# Patient Record
Sex: MALE | Race: WHITE | NOT HISPANIC OR LATINO | Employment: FULL TIME | URBAN - METROPOLITAN AREA
[De-identification: names, ages, dates, MRNs, and addresses within clinical notes are randomized per-mention and may not be internally consistent; named-entity substitution may affect disease eponyms.]

---

## 2017-02-23 ENCOUNTER — OFFICE VISIT - HEALTHEAST (OUTPATIENT)
Dept: FAMILY MEDICINE | Facility: CLINIC | Age: 32
End: 2017-02-23

## 2017-02-23 DIAGNOSIS — F34.0 CYCLOTHYMIA: ICD-10-CM

## 2017-02-23 ASSESSMENT — MIFFLIN-ST. JEOR: SCORE: 1797.2

## 2017-03-22 ENCOUNTER — COMMUNICATION - HEALTHEAST (OUTPATIENT)
Dept: FAMILY MEDICINE | Facility: CLINIC | Age: 32
End: 2017-03-22

## 2020-08-20 ENCOUNTER — AMBULATORY - HEALTHEAST (OUTPATIENT)
Dept: FAMILY MEDICINE | Facility: CLINIC | Age: 35
End: 2020-08-20

## 2020-08-20 ENCOUNTER — VIRTUAL VISIT (OUTPATIENT)
Dept: FAMILY MEDICINE | Facility: OTHER | Age: 35
End: 2020-08-20
Payer: COMMERCIAL

## 2020-08-20 DIAGNOSIS — Z20.822 SUSPECTED COVID-19 VIRUS INFECTION: ICD-10-CM

## 2020-08-20 PROCEDURE — 99421 OL DIG E/M SVC 5-10 MIN: CPT | Performed by: PHYSICIAN ASSISTANT

## 2020-08-20 NOTE — PROGRESS NOTES
"Date: 2020 11:39:08  Clinician: Francisco Parr  Clinician NPI: [id number]  Patient: Jose Carlos Blandon  Patient : 1985  Patient Address: [address] [place] [address]  Patient Phone: [phone]  Visit Protocol: URI  Patient Summary:  Jose Carlos is a 34 year old ( : 1985 ) male who initiated a Visit for COVID-19 (Coronavirus) evaluation and screening. When asked the question \"Please sign me up to receive news, health information and promotions from [employer]..\", Jose Carlos responded \"No\".    Jose Carlos states his symptoms started 1-2 days ago.   His symptoms consist of enlarged lymph nodes, diarrhea, nasal congestion, rhinitis, myalgia, and facial pain or pressure.   Symptom details     Nasal secretions: The color of his mucus is white, yellow, and green.    Facial pain or pressure: The facial pain or pressure does not feel worse when bending or leaning forward.      Jose Carlos denies having ear pain, headache, chills, malaise, fever, wheezing, sore throat, teeth pain, ageusia, cough, vomiting, nausea, and anosmia. He also denies having recent facial or sinus surgery in the past 60 days and taking antibiotic medication in the past month. He is not experiencing dyspnea.   Precipitating events  He has not recently been exposed to someone with influenza. Jose Carlos has been in close contact with the following high risk individuals: immunocompromised people and children under the age of 5.   Pertinent COVID-19 (Coronavirus) information  In the past 14 days, Jose Carlos has not worked in a congregate living setting.   He does not work or volunteer as healthcare worker or a  and does not work or volunteer in a healthcare facility.   Jose Carlos also has not lived in a congregate living setting in the past 14 days. He lives with a healthcare worker.   Jose Carlos has not had a close contact with a laboratory-confirmed COVID-19 patient within 14 days of symptom onset.   Since 2019, Jose Carlos " and has not had upper respiratory infection or influenza-like illness. Has not been diagnosed with lab-confirmed COVID-19 test   Pertinent medical history  Jose Carlos had 1 sinus infection within the past year.   Jose Carlos needs a return to work/school note.   Weight: 200 lbs   Jose Carlos does not smoke or use smokeless tobacco.   Weight: 200 lbs    MEDICATIONS: Lamictal oral, ALLERGIES: NKDA  Clinician Response:  Dear Jose Carlos,   Your symptoms show that you may have coronavirus (COVID-19). This illness can cause fever, cough and trouble breathing. Many people get a mild case and get better on their own. Some people can get very sick.  What should I do?  We would like to test you for this virus.   1. Please call [phone] to schedule your visit. Explain that you were referred by [facility] to have a COVID-19 test. Be ready to share your [facility] visit ID number.  The following will serve as your written order for this COVID Test, ordered by me, for the indication of suspected COVID [Z20.828]: The test will be ordered in [employer], our electronic health record, after you are scheduled. It will show as ordered and authorized by Rigo Antony MD.  Order: COVID-19 (Coronavirus) PCR for SYMPTOMATIC testing from [facility].      2. When it's time for your COVID test:  Stay at least 6 feet away from others. (If someone will drive you to your test, stay in the backseat, as far away from the  as you can.)   Cover your mouth and nose with a mask, tissue or washcloth.  Go straight to the testing site. Don't make any stops on the way there or back.      3.Starting now: Stay home and away from others (self-isolate) until:   You've had no fever---and no medicine that reduces fever---for one full day (24 hours). And...   Your other symptoms have gotten better. For example, your cough or breathing has improved. And...   At least 10 days have passed since your symptoms started.       During this time, don't leave the house except for testing or  "medical care.   Stay in your own room, even for meals. Use your own bathroom if you can.   Stay away from others in your home. No hugging, kissing or shaking hands. No visitors.  Don't go to work, school or anywhere else.    Clean \"high touch\" surfaces often (doorknobs, counters, handles, etc.). Use a household cleaning spray or wipes. You'll find a full list of  on the EPA website: www.epa.gov/pesticide-registration/list-n-disinfectants-use-against-sars-cov-2.   Cover your mouth and nose with a mask, tissue or washcloth to avoid spreading germs.  Wash your hands and face often. Use soap and water.  Caregivers in these groups are at risk for severe illness due to COVID-19:  o People 65 years and older  o People who live in a nursing home or long-term care facility  o People with chronic disease (lung, heart, cancer, diabetes, kidney, liver, immunologic)  o People who have a weakened immune system, including those who:   Are in cancer treatment  Take medicine that weakens the immune system, such as corticosteroids  Had a bone marrow or organ transplant  Have an immune deficiency  Have poorly controlled HIV or AIDS  Are obese (body mass index of 40 or higher)  Smoke regularly   o Caregivers should wear gloves while washing dishes, handling laundry and cleaning bedrooms and bathrooms.  o Use caution when washing and drying laundry: Don't shake dirty laundry, and use the warmest water setting that you can.  o For more tips, go to www.cdc.gov/coronavirus/2019-ncov/downloads/10Things.pdf.    4.Sign up for [employer]. We know it's scary to hear that you might have COVID-19. We want to track your symptoms to make sure you're okay over the next 2 weeks. Please look for an email from [employer]---this is a free, online program that we'll use to keep in touch. To sign up, follow the link in the email. Learn more at [email or web address]  How can I take care of myself?   Get lots of rest. Drink extra " fluids (unless a doctor has told you not to).   Take Tylenol (acetaminophen) for fever or pain. If you have liver or kidney problems, ask your family doctor if it's okay to take Tylenol.   Adults can take either:    650 mg (two 325 mg pills) every 4 to 6 hours, or...   1,000 mg (two 500 mg pills) every 8 hours as needed.    Note: Don't take more than 3,000 mg in one day. Acetaminophen is found in many medicines (both prescribed and over-the-counter medicines). Read all labels to be sure you don't take too much.   For children, check the Tylenol bottle for the right dose. The dose is based on the child's age or weight.    If you have other health problems (like cancer, heart failure, an organ transplant or severe kidney disease): Call your specialty clinic if you don't feel better in the next 2 days.       Know when to call 911. Emergency warning signs include:    Trouble breathing or shortness of breath Pain or pressure in the chest that doesn't go away Feeling confused like you haven't felt before, or not being able to wake up Bluish-colored lips or face.  Where can I get more information?   [facility] -- About COVID-19: [email or web address]   CDC -- What to Do If You're Sick: www.cdc.gov/coronavirus/2019-ncov/about/steps-when-sick.html   CDC -- Ending Home Isolation: www.cdc.gov/coronavirus/2019-ncov/hcp/disposition-in-home-patients.html   CDC -- Caring for Someone: www.cdc.gov/coronavirus/2019-ncov/if-you-are-sick/care-for-someone.html   [facility] -- Interim Guidance for Hospital Discharge to Home: www.health.[facility].[place]/diseases/coronavirus/hcp/hospdischarge.pdf   [facility] clinical trials (COVID-19 research studies): [email or web address].[email or web address]/umn-clinical-trials    Below are the COVID-19 hotlines at the [place] Department of Health ([facility]). Interpreters are available.    For health questions: Call 201-215-8142 or 1-679.596.8673 (7 a.m. to 7 p.m.) For questions about schools and  childcare: Call 436-412-3827 or 1-298.667.6905 (7 a.m. to 7 p.m.)    Diagnosis: Nasal congestion  Diagnosis ICD: R09.81

## 2020-08-22 ENCOUNTER — COMMUNICATION - HEALTHEAST (OUTPATIENT)
Dept: SCHEDULING | Facility: CLINIC | Age: 35
End: 2020-08-22

## 2020-10-13 ENCOUNTER — COMMUNICATION - HEALTHEAST (OUTPATIENT)
Dept: SCHEDULING | Facility: CLINIC | Age: 35
End: 2020-10-13

## 2021-04-20 ENCOUNTER — OFFICE VISIT (OUTPATIENT)
Dept: FAMILY MEDICINE | Facility: CLINIC | Age: 36
End: 2021-04-20
Payer: COMMERCIAL

## 2021-04-20 VITALS
SYSTOLIC BLOOD PRESSURE: 122 MMHG | HEART RATE: 79 BPM | TEMPERATURE: 97.6 F | BODY MASS INDEX: 32.33 KG/M2 | WEIGHT: 206 LBS | OXYGEN SATURATION: 98 % | RESPIRATION RATE: 14 BRPM | HEIGHT: 67 IN | DIASTOLIC BLOOD PRESSURE: 80 MMHG

## 2021-04-20 DIAGNOSIS — L03.116 CELLULITIS OF LEFT LOWER EXTREMITY: Primary | ICD-10-CM

## 2021-04-20 PROBLEM — F34.0 CYCLOTHYMIA: Status: ACTIVE | Noted: 2017-02-23

## 2021-04-20 PROCEDURE — 99213 OFFICE O/P EST LOW 20 MIN: CPT | Performed by: FAMILY MEDICINE

## 2021-04-20 RX ORDER — LAMOTRIGINE 200 MG/1
200 TABLET ORAL
COMMUNITY
Start: 2021-03-24

## 2021-04-20 ASSESSMENT — PAIN SCALES - GENERAL: PAINLEVEL: MILD PAIN (2)

## 2021-04-20 ASSESSMENT — MIFFLIN-ST. JEOR: SCORE: 1833.04

## 2021-04-20 NOTE — PATIENT INSTRUCTIONS
Wound care at this point in time:  - Finish three more days of augmentin just to be on the safe side  - Regular soap and water, no scrubbing. Using your antibacterial soap is fine.  - No more neosporin, however keeping it covered with something for moisture such as vaseline/aquaphor

## 2021-04-20 NOTE — PROGRESS NOTES
Assessment & Plan     Cellulitis of left lower extremity  Healing appropriately but will extend 3 more days for total 10d course just to be on safe side. Pt does not regularly keep connected with care. I do not think pt has bleeding disorder, immune deficiency, connective tissue diseases, and likely wound healing is actually kind of normal considering he gets wounds often. I did rec he have preventive checkup at least age 35 to screen for cholesterol/DM.  See pt instr.  - amoxicillin-clavulanate (AUGMENTIN) 875-125 MG tablet  Dispense: 6 tablet; Refill: 0    Patient Instructions   Wound care at this point in time:  - Finish three more days of augmentin just to be on the safe side  - Regular soap and water, no scrubbing. Using your antibacterial soap is fine.  - No more neosporin, however keeping it covered with something for moisture such as vaseline/aquaphor          Return if symptoms worsen or fail to improve, for Routine preventive when able.    Etelvina Antony MD  [facility]      Aida Branch is a 35 year old who presents for the following health issues     HPI     Chief Complaint   Patient presents with     Wound Check     pt has a wound on front of left ankle was seen 4/13/21 in [facility] urgent clinc was given antibiotics wound is still open and red with some swelling     This wound was from 2 weeks ago, ran into [facility]  Seen last week and give 7d augmentin. much larger red area and pitting edema  Has been using neosporin, washing wound with antibacterial soap  Initially reported he was scrubbing it but then clarified not with force  Feels like he has had a long time healing in general whenever he gets cuts and wounds  Not prone to nosebleeds  No hx of blood when brushing teeth  No hx of frequent infections in wounds  Hx of many wounds due to active person    Pt reports he would not have come in and had this wound checked if he hadn't been encouraged by his partner  "and mom    Review of Systems   Constitutional, HEENT, cardiovascular, pulmonary, gi and gu systems are negative, except as otherwise noted.        Objective    /80 (BP Location: Right arm, Patient Position: Chair, Cuff Size: Adult Large)   Pulse 79   Temp 97.6  F (36.4  C) (Tympanic)   Resp 14   Ht 1.702 m (5' 7\")   Wt 93.4 kg (206 lb)   SpO2 98%   BMI 32.26 kg/m    Body mass index is 32.26 kg/m .  Physical Exam   GENERAL: healthy, alert and no distress  MS: no gross musculoskeletal defects noted, no edema  SKIN: semi-linear wound overlying anterio L ankle with 1-2cm surrounding non-blanchable red-brown color. Clean and wound edges clean, with base of wound showing some fibrous tissue and no pus, without add'l erythema nor edema.  PSYCH: mentation appears normal, affect normal/bright    "

## 2021-05-30 VITALS — BODY MASS INDEX: 29.49 KG/M2 | HEIGHT: 68 IN | WEIGHT: 194.6 LBS

## 2021-06-09 NOTE — PROGRESS NOTES
PSYCHIATRY  CLINIC CONSULT     DIAGNOSIS   1. Cyclothymia          PLAN   1. Ongoing education given regarding diagnostic and treatment options risks, benefits and alternatives with adequate verbalization of understanding.  2.  Lamictal 25 mg ×2 weeks then 50 mg daily ×2 weeks then 100 milligrams daily.  3. Crisis management planning in place.  4. Continue with Primary Care Provider.  5.  Stressed importance of regular exercise and sleep  6. Psychotherapy provided in-session.  7. Medication side effects/warning signs reviewed including SJS.  8. Return in about 7 weeks (around 4/13/2017) for Recheck.    Risk Assessment: Patient able to contract for safety           DATE OF SERVICE   2/23/2017         CHIEF COMPLAINT   Chief Complaint   Patient presents with     Consult     Biopolar 3 Patient is Family           CONSULT REQUEST BY   Family referral       HISTORY OF PRESENT ILLNESS   This is a 31 y.o. male with history of anxiety and dysthymia since childhood.  States that the anxiety has always been more prevalent.  He does describe episodes of depressed mood but no severe major depression episodes or hospitalizations.  States as the years go on his anxiety and concentration/focus seems to worsen.  Does have a history of both initial and middle insomnia.  Generally gets only for sleep at night.    He was seen by a provider about 6 years ago and started on a presumed SSRI.  Took for about 6 months.  He did complain of sexual side effects with no mood improvement so he stopped the medication.    His brother and father were recently diagnosed bipolar disorder in the last year.  Both have responded well to mood stabilizing medications and is here to consider this.  He has done some research on bipolar disorder.  States he does feel that he meets some of the criteria other than he's never had any episode  of major depression.    PSYCHIATRIC ROS:  Mood:  -Depression: History of mild to moderate depression in the  past.  -Christina: Endorses manic symptoms in the past including irritability, increased self confidence, needing less sleep than usual, racing thoughts, impulsive shopping and gambling, impulsive behavior. MDQ=10/13  Impaired Sleep: yes  Impaired Energy: too much  Change of Interest/Anhedonia: yes  Appetite/Weight Changes: no  Concentration Changes: yes  Negative cognitions of self: yes  Tearfulness: no  Anxiety/Panic: yes  SI/VI/HI: no  Psychosis:  no  Eating Disorder: NO  SIB: NO     CHEMICAL DEPENDENCY HISTORY   History   Drug Use Not on file       History   Alcohol use Not on file       History   Smoking Status     Not on file   Smokeless Tobacco     Not on file       Treatment: none  Detox: none  Legal: none     website reviewed to check CS use     PAST PSYCHIATRIC HISTORY   Psychiatric provider: none  Therapist: none  Hospitalizations: none  History of Commitments: none  Past Medications: ?SSRI 6 yrs ago  ECT:  no  Suicide Attempts/Gun Access: none    Community Supports: Family       PAST MEDICAL HISTORY   Past Medical History:   Diagnosis Date     Anxiety      Depression        Past Surgical History:   Procedure Laterality Date     DENTAL SURGERY       GANGLION CYST EXCISION         Primary Care Provider: No Primary Care Provider      ALLERGIES: Review of patient's allergies indicates not on file.       MEDICATIONS   No current outpatient prescriptions on file.     No current facility-administered medications for this visit.        Medication adherence: Reviewed risk/benefits of medication , Patient able to verbalize understanding of side effects  and Patient verbally consents to taking medications       ROS   A 12 point comprehensive review of systems was negative except as noted.       FAMILY HISTORY   Family History   Problem Relation Age of Onset     Bipolar disorder Father      Bipolar disorder Brother          SOCIAL HISTORY   Social History     Social History     Marital status: Single     Spouse name:  "N/A     Number of children: N/A     Years of education: N/A     Occupational History     Not on file.     Social History Main Topics     Smoking status: Not on file     Smokeless tobacco: Not on file     Alcohol use Not on file     Drug use: Not on file     Sexual activity: Not on file     Other Topics Concern     Not on file     Social History Narrative       Born and Raised: Born and raised in [place].  Grew up in an intact family with 2 brothers.  States that he did not due to well in school that he graduated from high school.  He later attended vocational school in did go on to complete some more college but never graduated with a 4 year degree.  Denies any trauma or abuse  Occupation: Works full-time for [employer]  Marital Status: Single, recently unengaged.  Children: None  Living Situation: Living arrangements - the patient lives with their family.  Her back in with his parents and his brother in the last month.       MENTAL STATUS EXAM   Appearance:  Clean/neat  Mood:  anxious and dysthymic  Affect: euthymic  Suicidal Ideation: absent  Homicidal Ideation: absent  Thought process: normal  Thought content: Normal  Fund of Knowledge: Sufficient  Attention/Concentration: intact  Language ability: intact  Memory: recent and remote memory intact  Insight and Judgement: good  Orientation: person, place, time and situation  Psychomotor Behavior: normal  Muscle Strength and Tone: normal  Gait and Station: normal gait and station       PHYSICAL EXAM   Vitals:   Visit Vitals     /70 (Patient Site: Right Arm, Patient Position: Sitting, Cuff Size: Adult Regular)     Pulse 65     Ht 5' 8\" (1.727 m)     Wt 194 lb 9.6 oz (88.3 kg)     SpO2 98%     BMI 29.59 kg/m2       General appearance - alert, well appearing, and in no distress  Mental status - alert, oriented to person, place, and time  Neurological - alert, oriented, normal speech, no focal findings or movement disorder noted         LABS   n/a  not " applicable          Total time  60 minutes with > 50% spent on coordination of cares and psycho-education.        Triny De La Paz, CNP

## 2021-07-28 ENCOUNTER — OFFICE VISIT (OUTPATIENT)
Dept: FAMILY MEDICINE | Facility: CLINIC | Age: 36
End: 2021-07-28
Payer: COMMERCIAL

## 2021-07-28 VITALS
TEMPERATURE: 98 F | DIASTOLIC BLOOD PRESSURE: 84 MMHG | RESPIRATION RATE: 18 BRPM | HEIGHT: 67 IN | WEIGHT: 210 LBS | HEART RATE: 82 BPM | BODY MASS INDEX: 32.96 KG/M2 | SYSTOLIC BLOOD PRESSURE: 116 MMHG | OXYGEN SATURATION: 97 %

## 2021-07-28 DIAGNOSIS — M20.011 MALLET FINGER OF RIGHT FINGER(S): ICD-10-CM

## 2021-07-28 DIAGNOSIS — K52.9 CHRONIC DIARRHEA OF UNKNOWN ORIGIN: ICD-10-CM

## 2021-07-28 DIAGNOSIS — Z00.00 ROUTINE GENERAL MEDICAL EXAMINATION AT A HEALTH CARE FACILITY: Primary | ICD-10-CM

## 2021-07-28 LAB
ALBUMIN SERPL-MCNC: 3.9 G/DL (ref 3.4–5)
ALP SERPL-CCNC: 59 U/L (ref 40–150)
ALT SERPL W P-5'-P-CCNC: 42 U/L (ref 0–70)
ANION GAP SERPL CALCULATED.3IONS-SCNC: 7 MMOL/L (ref 3–14)
AST SERPL W P-5'-P-CCNC: 59 U/L (ref 0–45)
BILIRUB SERPL-MCNC: 0.5 MG/DL (ref 0.2–1.3)
BUN SERPL-MCNC: 19 MG/DL (ref 7–30)
CALCIUM SERPL-MCNC: 8.7 MG/DL (ref 8.5–10.1)
CHLORIDE BLD-SCNC: 107 MMOL/L (ref 94–109)
CO2 SERPL-SCNC: 26 MMOL/L (ref 20–32)
CREAT SERPL-MCNC: 1.14 MG/DL (ref 0.66–1.25)
ERYTHROCYTE [DISTWIDTH] IN BLOOD BY AUTOMATED COUNT: 12.8 % (ref 10–15)
GFR SERPL CREATININE-BSD FRML MDRD: 83 ML/MIN/1.73M2
GLUCOSE BLD-MCNC: 83 MG/DL (ref 70–99)
HCT VFR BLD AUTO: 44 % (ref 40–53)
HGB BLD-MCNC: 15.4 G/DL (ref 13.3–17.7)
MCH RBC QN AUTO: 29 PG (ref 26.5–33)
MCHC RBC AUTO-ENTMCNC: 35 G/DL (ref 31.5–36.5)
MCV RBC AUTO: 83 FL (ref 78–100)
PLATELET # BLD AUTO: 243 10E3/UL (ref 150–450)
POTASSIUM BLD-SCNC: 3.7 MMOL/L (ref 3.4–5.3)
PROT SERPL-MCNC: 7.4 G/DL (ref 6.8–8.8)
RBC # BLD AUTO: 5.31 10E6/UL (ref 4.4–5.9)
SODIUM SERPL-SCNC: 140 MMOL/L (ref 133–144)
TSH SERPL DL<=0.005 MIU/L-ACNC: 0.92 MU/L (ref 0.4–4)
WBC # BLD AUTO: 7.1 10E3/UL (ref 4–11)

## 2021-07-28 PROCEDURE — 84443 ASSAY THYROID STIM HORMONE: CPT | Performed by: FAMILY MEDICINE

## 2021-07-28 PROCEDURE — 99395 PREV VISIT EST AGE 18-39: CPT | Performed by: FAMILY MEDICINE

## 2021-07-28 PROCEDURE — 80053 COMPREHEN METABOLIC PANEL: CPT | Performed by: FAMILY MEDICINE

## 2021-07-28 PROCEDURE — 85027 COMPLETE CBC AUTOMATED: CPT | Performed by: FAMILY MEDICINE

## 2021-07-28 PROCEDURE — 36415 COLL VENOUS BLD VENIPUNCTURE: CPT | Performed by: FAMILY MEDICINE

## 2021-07-28 PROCEDURE — 99214 OFFICE O/P EST MOD 30 MIN: CPT | Mod: 25 | Performed by: FAMILY MEDICINE

## 2021-07-28 PROCEDURE — 83516 IMMUNOASSAY NONANTIBODY: CPT | Mod: 59 | Performed by: FAMILY MEDICINE

## 2021-07-28 ASSESSMENT — ENCOUNTER SYMPTOMS
HEMATOCHEZIA: 0
HEMATURIA: 0
DIZZINESS: 0
HEADACHES: 0
WEAKNESS: 0
FREQUENCY: 0
ABDOMINAL PAIN: 0
NERVOUS/ANXIOUS: 0
JOINT SWELLING: 0
COUGH: 0
EYE PAIN: 0
SHORTNESS OF BREATH: 0
NAUSEA: 0
DIARRHEA: 0
PALPITATIONS: 0
DYSURIA: 0
PARESTHESIAS: 0
CONSTIPATION: 0
HEARTBURN: 0
CHILLS: 0
ARTHRALGIAS: 0
FEVER: 0
SORE THROAT: 0
MYALGIAS: 0

## 2021-07-28 ASSESSMENT — MIFFLIN-ST. JEOR: SCORE: 1851.18

## 2021-07-28 NOTE — PROGRESS NOTES
SUBJECTIVE:   CC: Jose Carlos Blandon is an 35 year old male who presents for preventative health visit.     Patient has been advised of split billing requirements and indicates understanding: Yes  Healthy Habits:     Getting at least 3 servings of Calcium per day:  NO    Bi-annual eye exam:  Yes    Dental care twice a year:  Yes    Sleep apnea or symptoms of sleep apnea:  None    Diet:  Other    Frequency of exercise:  2-3 days/week    Duration of exercise:  15-30 minutes    Taking medications regularly:  Yes    Barriers to taking medications:  Not applicable    Medication side effects:  None    PHQ-2 Total Score: 0    Additional concerns today:  No    Chief Complaint   Patient presents with     Physical     Referral     to [facility] R hand 3 finger      Health Maintenance     patient declined COVID vaccine at this time      Today's PHQ-2 Score:   PHQ-2 ( 1999 Pfizer) 7/28/2021   Q1: Little interest or pleasure in doing things 0   Q2: Feeling down, depressed or hopeless 0   PHQ-2 Score 0   Q1: Little interest or pleasure in doing things Not at all   Q2: Feeling down, depressed or hopeless Not at all   PHQ-2 Score 0     Abuse: Current or Past(Physical, Sexual or Emotional)- No  Do you feel safe in your environment? Yes    Have you ever done Advance Care Planning? (For example, a Health Directive, POLST, or a discussion with a medical provider or your loved ones about your wishes): No, advance care planning information given to patient to review.  Patient plans to discuss their wishes with loved ones or provider.      Social History     Tobacco Use     Smoking status: Never Smoker     Smokeless tobacco: Never Used   Substance Use Topics     Alcohol use: Not Currently     If you drink alcohol do you typically have >3 drinks per day or >7 drinks per week? No    Alcohol Use 7/28/2021   Prescreen: >3 drinks/day or >7 drinks/week? No   Prescreen: >3 drinks/day or >7 drinks/week? -     Last PSA: No results found for:  "PSA    Reviewed orders with patient. Reviewed health maintenance and updated orders accordingly - Yes    Was tucking his pants into water boots and rolled his finger and felt it pop  4th digit of R hand.    Diarrhea and bloating, diarrhea improved with Shaniqua supplement. has tried elimination diets in past did not help  Without the Shaniqua he was having diarrhea several times in the AM  Knows that he is lactose intolerant  lamictal makes it worse too but he had it before the lamictal    Reviewed and updated as needed this visit by clinical staff  Tobacco  Allergies  Meds  Problems  Med Hx  Surg Hx  Fam Hx  Soc Hx        Reviewed and updated as needed this visit by Provider  Tobacco  Allergies  Meds  Problems  Med Hx  Surg Hx  Fam Hx           Family History   Problem Relation Age of Onset     Bipolar Disorder Father      Bipolar Disorder Brother    Stomach cancer in his Dad age 55 diagnosed  Paternal great uncle - \"Intestinal cancer\" no details, 80s    Review of Systems   Constitutional: Negative for chills and fever.   HENT: Negative for congestion, ear pain, hearing loss and sore throat.    Eyes: Negative for pain and visual disturbance.   Respiratory: Negative for cough and shortness of breath.    Cardiovascular: Negative for chest pain, palpitations and peripheral edema.   Gastrointestinal: Negative for abdominal pain, constipation, diarrhea, heartburn, hematochezia and nausea.   Genitourinary: Negative for discharge, dysuria, frequency, genital sores, hematuria, impotence and urgency.   Musculoskeletal: Negative for arthralgias, joint swelling and myalgias.   Skin: Negative for rash.   Neurological: Negative for dizziness, weakness, headaches and paresthesias.   Psychiatric/Behavioral: Negative for mood changes. The patient is not nervous/anxious.      OBJECTIVE:   /84   Pulse 82   Temp 98  F (36.7  C) (Tympanic)   Resp 18   Ht 1.702 m (5' 7\")   Wt 95.3 kg (210 lb)   SpO2 97%   BMI 32.89 " kg/m      Physical Exam  GENERAL: healthy, alert and no distress  EYES: Eyes grossly normal to inspection, PERRL and conjunctivae and sclerae normal  HENT: ear canals and TM's normal, nose and mouth without ulcers or lesions  NECK: no adenopathy, no asymmetry, masses, or scars and thyroid normal to palpation  RESP: lungs clear to auscultation - no rales, rhonchi or wheezes  CV: regular rate and rhythm, normal S1 S2, no S3 or S4, no murmur, click or rub, no peripheral edema and peripheral pulses strong  ABDOMEN: soft, nontender, no hepatosplenomegaly, no masses and bowel sounds normal  MS: Zero extensor strength at 4th DIP of R hand. no gross musculoskeletal defects noted.otherwise, no edema  SKIN: no suspicious lesions or rashes. Interval well-healed wound of anterior ankle SIRS versus SIRS  NEURO: Normal strength and tone, mentation intact and speech normal  PSYCH: mentation appears normal, affect normal/bright    ASSESSMENT/PLAN:   Jose Carlos was seen today for physical, referral and health maintenance.    Diagnoses and all orders for this visit:    Routine general medical examination at a health care facility  -     REVIEW OF HEALTH MAINTENANCE PROTOCOL ORDERS    Mallet finger of right finger(s)  -     Orthopedic  Referral; Future    Chronic diarrhea of unknown origin  -     Adult Gastro Ref - Procedure Only; Future  -     Comprehensive metabolic panel (BMP + Alb, Alk Phos, ALT, AST, Total. Bili, TP); Future  -     CBC with platelets; Future  -     Tissue transglutaminase ana IgA and IgG; Future  -     TSH with free T4 reflex; Future  -     Comprehensive metabolic panel (BMP + Alb, Alk Phos, ALT, AST, Total. Bili, TP)  -     CBC with platelets  -     Tissue transglutaminase ana IgA and IgG  -     TSH with free T4 reflex      Patient has been advised of split billing requirements and indicates understanding: Yes    COUNSELING:   Reviewed preventive health counseling, as reflected in patient instructions      "  Healthy diet/nutrition       Consider Hep C screening for all patients one time for ages 18-79 years       HIV screeninx in teen years, 1x in adult years, and at intervals if high risk    Estimated body mass index is 32.89 kg/m  as calculated from the following:    Height as of this encounter: 1.702 m (5' 7\").    Weight as of this encounter: 95.3 kg (210 lb).     Weight management plan: Discussed healthy diet and exercise guidelines and HAES principles, including benefits of healthy diet and exercise regardless of body size    He reports that he has never smoked. He has never used smokeless tobacco.      Etelvina Antony MD  [facility]  "

## 2021-07-29 LAB
TTG IGA SER-ACNC: 0.9 U/ML
TTG IGG SER-ACNC: <0.6 U/ML

## 2021-08-15 DIAGNOSIS — Z11.59 ENCOUNTER FOR SCREENING FOR OTHER VIRAL DISEASES: ICD-10-CM

## 2021-08-26 ENCOUNTER — ANESTHESIA EVENT (OUTPATIENT)
Dept: GASTROENTEROLOGY | Facility: CLINIC | Age: 36
End: 2021-08-26
Payer: COMMERCIAL

## 2021-08-26 NOTE — ANESTHESIA PREPROCEDURE EVALUATION
Anesthesia Pre-Procedure Evaluation    Patient: Jose Carlos Blandon   MRN: [id number] : 1985        Preoperative Diagnosis: Chronic diarrhea of unknown origin [K52.9]   Procedure : Procedure(s):  COLONOSCOPY     No past medical history on file.   Past Surgical History:   Procedure Laterality Date     DENTAL SURGERY       EXCISE GANGLION WRIST        No Known Allergies   Social History     Tobacco Use     Smoking status: Never Smoker     Smokeless tobacco: Never Used   Substance Use Topics     Alcohol use: Not Currently      Wt Readings from Last 1 Encounters:   21 95.3 kg (210 lb)        Anesthesia Evaluation   Pt has had prior anesthetic. Type: MAC.    No history of anesthetic complications       ROS/MED HX  ENT/Pulmonary:  - neg pulmonary ROS     Neurologic:  - neg neurologic ROS     Cardiovascular:  - neg cardiovascular ROS     METS/Exercise Tolerance:     Hematologic:  - neg hematologic  ROS     Musculoskeletal:  - neg musculoskeletal ROS     GI/Hepatic: Comment: Chronic diarrhea      Renal/Genitourinary:  - neg Renal ROS     Endo:     (+) Obesity,     Psychiatric/Substance Use:     (+) psychiatric history anxiety and other (comment) (cyclothymia)     Infectious Disease:  - neg infectious disease ROS     Malignancy:  - neg malignancy ROS     Other:  - neg other ROS          Physical Exam    Airway  airway exam normal      Mallampati: II       Respiratory Devices and Support         Dental  no notable dental history         Cardiovascular   cardiovascular exam normal          Pulmonary   pulmonary exam normal                OUTSIDE LABS:  CBC:   Lab Results   Component Value Date    WBC 7.1 2021    HGB 15.4 2021    HCT 44.0 2021     2021     BMP:   Lab Results   Component Value Date     2021    POTASSIUM 3.7 2021    CHLORIDE 107 2021    CO2 26 2021    BUN 19 2021    CR 1.14 2021    GLC 83 2021     COAGS: No results found  for: PTT, INR, FIBR  POC: No results found for: BGM, HCG, HCGS  HEPATIC:   Lab Results   Component Value Date    ALBUMIN 3.9 07/28/2021    PROTTOTAL 7.4 07/28/2021    ALT 42 07/28/2021    AST 59 (H) 07/28/2021    ALKPHOS 59 07/28/2021    BILITOTAL 0.5 07/28/2021     OTHER:   Lab Results   Component Value Date    LUCILLE 8.7 07/28/2021    TSH 0.92 07/28/2021       Anesthesia Plan    ASA Status:  2   NPO Status:  NPO Appropriate    Anesthesia Type: General.     - Airway: Native airway      Maintenance: Balanced.        Consents    Anesthesia Plan(s) and associated risks, benefits, and realistic alternatives discussed. Questions answered and patient/representative(s) expressed understanding.     - Discussed with:  Patient         Postoperative Care            Comments:                JOCELYNE Harrington CRNA

## 2021-08-27 ENCOUNTER — LAB (OUTPATIENT)
Dept: LAB | Facility: CLINIC | Age: 36
End: 2021-08-27
Payer: COMMERCIAL

## 2021-08-27 DIAGNOSIS — Z11.59 ENCOUNTER FOR SCREENING FOR OTHER VIRAL DISEASES: ICD-10-CM

## 2021-08-27 PROCEDURE — U0003 INFECTIOUS AGENT DETECTION BY NUCLEIC ACID (DNA OR RNA); SEVERE ACUTE RESPIRATORY SYNDROME CORONAVIRUS 2 (SARS-COV-2) (CORONAVIRUS DISEASE [COVID-19]), AMPLIFIED PROBE TECHNIQUE, MAKING USE OF HIGH THROUGHPUT TECHNOLOGIES AS DESCRIBED BY CMS-2020-01-R: HCPCS

## 2021-08-27 PROCEDURE — U0005 INFEC AGEN DETEC AMPLI PROBE: HCPCS

## 2021-08-28 LAB — SARS-COV-2 RNA RESP QL NAA+PROBE: NEGATIVE

## 2021-08-31 ENCOUNTER — HOSPITAL ENCOUNTER (OUTPATIENT)
Facility: CLINIC | Age: 36
Discharge: HOME | End: 2021-08-31
Attending: SURGERY | Admitting: SURGERY
Payer: COMMERCIAL

## 2021-08-31 ENCOUNTER — ANESTHESIA (OUTPATIENT)
Dept: GASTROENTEROLOGY | Facility: CLINIC | Age: 36
End: 2021-08-31
Payer: COMMERCIAL

## 2021-08-31 VITALS
OXYGEN SATURATION: 96 % | HEIGHT: 67 IN | DIASTOLIC BLOOD PRESSURE: 92 MMHG | TEMPERATURE: 98.8 F | HEART RATE: 85 BPM | SYSTOLIC BLOOD PRESSURE: 113 MMHG | BODY MASS INDEX: 32.96 KG/M2 | WEIGHT: 210 LBS | RESPIRATION RATE: 18 BRPM

## 2021-08-31 LAB — COLONOSCOPY: NORMAL

## 2021-08-31 PROCEDURE — 45385 COLONOSCOPY W/LESION REMOVAL: CPT | Performed by: SURGERY

## 2021-08-31 PROCEDURE — 370N000017 HC ANESTHESIA TECHNICAL FEE, PER MIN: Performed by: SURGERY

## 2021-08-31 PROCEDURE — 250N000011 HC RX IP 250 OP 636: Performed by: NURSE ANESTHETIST, CERTIFIED REGISTERED

## 2021-08-31 PROCEDURE — 88305 TISSUE EXAM BY PATHOLOGIST: CPT | Mod: TC | Performed by: SURGERY

## 2021-08-31 PROCEDURE — 250N000009 HC RX 250: Performed by: NURSE ANESTHETIST, CERTIFIED REGISTERED

## 2021-08-31 PROCEDURE — 45380 COLONOSCOPY AND BIOPSY: CPT | Mod: 59 | Performed by: SURGERY

## 2021-08-31 PROCEDURE — 45380 COLONOSCOPY AND BIOPSY: CPT | Mod: XU | Performed by: SURGERY

## 2021-08-31 PROCEDURE — 258N000003 HC RX IP 258 OP 636: Performed by: SURGERY

## 2021-08-31 RX ORDER — PROPOFOL 10 MG/ML
INJECTION, EMULSION INTRAVENOUS PRN
Status: DISCONTINUED | OUTPATIENT
Start: 2021-08-31 | End: 2021-08-31

## 2021-08-31 RX ORDER — GLYCOPYRROLATE 0.2 MG/ML
INJECTION, SOLUTION INTRAMUSCULAR; INTRAVENOUS PRN
Status: DISCONTINUED | OUTPATIENT
Start: 2021-08-31 | End: 2021-08-31

## 2021-08-31 RX ORDER — SODIUM CHLORIDE, SODIUM LACTATE, POTASSIUM CHLORIDE, CALCIUM CHLORIDE 600; 310; 30; 20 MG/100ML; MG/100ML; MG/100ML; MG/100ML
INJECTION, SOLUTION INTRAVENOUS CONTINUOUS
Status: DISCONTINUED | OUTPATIENT
Start: 2021-08-31 | End: 2021-08-31 | Stop reason: HOSPADM

## 2021-08-31 RX ORDER — ONDANSETRON 2 MG/ML
4 INJECTION INTRAMUSCULAR; INTRAVENOUS
Status: DISCONTINUED | OUTPATIENT
Start: 2021-08-31 | End: 2021-08-31 | Stop reason: HOSPADM

## 2021-08-31 RX ORDER — PROPOFOL 10 MG/ML
INJECTION, EMULSION INTRAVENOUS CONTINUOUS PRN
Status: DISCONTINUED | OUTPATIENT
Start: 2021-08-31 | End: 2021-08-31

## 2021-08-31 RX ORDER — LIDOCAINE HYDROCHLORIDE 10 MG/ML
INJECTION, SOLUTION INFILTRATION; PERINEURAL PRN
Status: DISCONTINUED | OUTPATIENT
Start: 2021-08-31 | End: 2021-08-31

## 2021-08-31 RX ORDER — LIDOCAINE 40 MG/G
CREAM TOPICAL
Status: DISCONTINUED | OUTPATIENT
Start: 2021-08-31 | End: 2021-08-31 | Stop reason: HOSPADM

## 2021-08-31 RX ADMIN — PROPOFOL 50 MG: 10 INJECTION, EMULSION INTRAVENOUS at 13:00

## 2021-08-31 RX ADMIN — PROPOFOL 30 MG: 10 INJECTION, EMULSION INTRAVENOUS at 12:39

## 2021-08-31 RX ADMIN — GLYCOPYRROLATE 0.1 MG: 0.2 INJECTION, SOLUTION INTRAMUSCULAR; INTRAVENOUS at 12:37

## 2021-08-31 RX ADMIN — SODIUM CHLORIDE, POTASSIUM CHLORIDE, SODIUM LACTATE AND CALCIUM CHLORIDE: 600; 310; 30; 20 INJECTION, SOLUTION INTRAVENOUS at 12:01

## 2021-08-31 RX ADMIN — LIDOCAINE HYDROCHLORIDE 30 MG: 10 INJECTION, SOLUTION INFILTRATION; PERINEURAL at 12:38

## 2021-08-31 RX ADMIN — PROPOFOL 20 MG: 10 INJECTION, EMULSION INTRAVENOUS at 12:45

## 2021-08-31 RX ADMIN — PROPOFOL 200 MCG/KG/MIN: 10 INJECTION, EMULSION INTRAVENOUS at 12:37

## 2021-08-31 RX ADMIN — GLYCOPYRROLATE 0.1 MG: 0.2 INJECTION, SOLUTION INTRAMUSCULAR; INTRAVENOUS at 12:38

## 2021-08-31 ASSESSMENT — MIFFLIN-ST. JEOR: SCORE: 1851.18

## 2021-08-31 NOTE — LETTER
"September 22, 2021      Jose Carlos Blandon  [address] [address] [address]        Dear ,    We are writing to inform you of your test results.    Your colon biopsies were all normal.  The polyp was also benign, but requires follow-up colonoscopy in 5 years.       Resulted Orders   Surgical Pathology Exam   Result Value Ref Range    Case Report       Surgical Pathology Report                         Case: [id number]                                  Authorizing Provider:  Artur Ashton MD         Collected:           08/31/2021 12:50 PM          Ordering Location:     [facility]   Received:            08/31/2021 01:44 PM                                 [place]                                                                      Pathologist:           Umberto Chavez MD                                                                           Specimens:   A) - Large Intestine, Colon, Random                                                                 B) - Large Intestine, Colon, Hepatic Flexure                                               Final Diagnosis       A: Large intestine, random, biopsy:  - Colonic mucosa without diagnostic abnormality.    B: Large intestine, hepatic flexure, polyp, biopsy/polypectomy:  - Sessile serrated adenoma negative for cytological dysplasia.      Case Images      Gross Description       A(1). Large Intestine, Colon, Random, :  The specimen is received in formalin, labeled with the patient's name, medical record number and other identifying information designated \"random colon\". It consists of multiple tan mucosal fragments, 0.2-0.5 cm.  The specimen is entirely submitted in 1 cassette.        B(2). Large Intestine, Colon, Hepatic Flexure, :  The specimen is received in formalin, labeled with the patient's name, medical record number and other identifying information " "designated \"hepatic flexure polyp\". It consists of 3 polypoid tissue fragments, 0.2-1.3 cm.  The 2 largest fragments are inked blue and black and sectioned.  The specimen is entirely submitted in 1 cassette.     (MANE Hel[facility])        Microscopic Description       A: Sections demonstrate colonic mucosa without histologic abnormality.  There are no sufficient features of colitis (microscopic or otherwise), polyp, dysplasia, or malignancy.    B: Sections demonstrate sessile serrated adenoma negative for cytological dysplasia.      Performing Labs       The technical component of this testing was completed at [facility]         If you have any questions or concerns, please call the clinic at the number listed above.       Sincerely,      Artur Ashton MD          "

## 2021-08-31 NOTE — ANESTHESIA CARE TRANSFER NOTE
Patient: Jose Carlos Blandon    Procedure(s):  COLONOSCOPY, WITH POLYPECTOMY AND BIOPSY  Colonoscopy, Flexible, With Lesion Removal Using Snare    Diagnosis: Chronic diarrhea of unknown origin [K52.9]  Diagnosis Additional Information: No value filed.    Anesthesia Type:   General     Note:    Oropharynx: oropharynx clear of all foreign objects and spontaneously breathing  Level of Consciousness: drowsy  Oxygen Supplementation: room air    Independent Airway: airway patency satisfactory and stable  Dentition: dentition unchanged  Vital Signs Stable: post-procedure vital signs reviewed and stable  Report to RN Given: handoff report given  Patient transferred to: Phase II    Handoff Report: Identifed the Patient, Identified the Reponsible Provider, Reviewed the pertinent medical history, Discussed the surgical course, Reviewed Intra-OP anesthesia mangement and issues during anesthesia, Set expectations for post-procedure period and Allowed opportunity for questions and acknowledgement of understanding      Vitals:  Vitals Value Taken Time   BP     Temp     Pulse     Resp     SpO2         Electronically Signed By: JOCELYNE Dahl CRNA  August 31, 2021  1:04 PM

## 2021-08-31 NOTE — ANESTHESIA POSTPROCEDURE EVALUATION
Patient: Jose Carlos Blandon    Procedure(s):  COLONOSCOPY, WITH POLYPECTOMY AND BIOPSY  Colonoscopy, Flexible, With Lesion Removal Using Snare    Diagnosis:Chronic diarrhea of unknown origin [K52.9]  Diagnosis Additional Information: No value filed.    Anesthesia Type:  General    Note:  Disposition: Outpatient   Postop Pain Control: Uneventful            Sign Out: Well controlled pain   PONV: No   Neuro/Psych: Uneventful            Sign Out: Acceptable/Baseline neuro status   Airway/Respiratory: Uneventful            Sign Out: Acceptable/Baseline resp. status   CV/Hemodynamics: Uneventful            Sign Out: Acceptable CV status; No obvious hypovolemia; No obvious fluid overload   Other NRE: NONE   DID A NON-ROUTINE EVENT OCCUR? No           Last vitals:  Vitals Value Taken Time   /67 08/31/21 1316   Temp     Pulse 72 08/31/21 1316   Resp 18 08/31/21 1316   SpO2 97 % 08/31/21 1321   Vitals shown include unvalidated device data.    Electronically Signed By: JOCELYNE Dahl CRNA  August 31, 2021  1:22 PM

## 2021-08-31 NOTE — BRIEF OP NOTE
[facility]   Brief Operative Note    Pre-operative diagnosis: Chronic diarrhea of unknown origin [K52.9]   Post-operative diagnosis single polyp, otherwise normal     Procedure: Procedure(s):  COLONOSCOPY, WITH POLYPECTOMY AND BIOPSY  Colonoscopy, Flexible, With Lesion Removal Using Snare   Surgeon(s): Surgeon(s) and Role:     * Artur Ashton MD - Primary   Estimated blood loss: * No values recorded between 8/31/2021 12:45 PM and 8/31/2021  1:02 PM *    Specimens: ID Type Source Tests Collected by Time Destination   1 :  Biopsy Large Intestine, Colon, Random SURGICAL PATHOLOGY EXAM Artur Ashton MD 8/31/2021 12:50 PM    2 :  Polyp Large Intestine, Colon, Hepatic Flexure SURGICAL PATHOLOGY EXAM Artur Ashton MD 8/31/2021 12:55 PM       Findings: 1. 4 mm polyp at hepatic flexure  2. Colon otherwise normal  3. Entire colon biopsied ro rule out microscopic colitis

## 2021-09-02 LAB
PATH REPORT.COMMENTS IMP SPEC: NORMAL
PATH REPORT.COMMENTS IMP SPEC: NORMAL
PATH REPORT.FINAL DX SPEC: NORMAL
PATH REPORT.GROSS SPEC: NORMAL
PATH REPORT.MICROSCOPIC SPEC OTHER STN: NORMAL
PHOTO IMAGE: NORMAL

## 2021-09-02 PROCEDURE — 88305 TISSUE EXAM BY PATHOLOGIST: CPT | Mod: 26 | Performed by: PATHOLOGY

## 2021-10-17 ENCOUNTER — HEALTH MAINTENANCE LETTER (OUTPATIENT)
Age: 36
End: 2021-10-17

## 2022-04-15 ENCOUNTER — OFFICE VISIT (OUTPATIENT)
Dept: FAMILY MEDICINE | Facility: CLINIC | Age: 37
End: 2022-04-15
Payer: COMMERCIAL

## 2022-04-15 VITALS
SYSTOLIC BLOOD PRESSURE: 104 MMHG | TEMPERATURE: 96.9 F | HEIGHT: 67 IN | BODY MASS INDEX: 33.74 KG/M2 | WEIGHT: 215 LBS | DIASTOLIC BLOOD PRESSURE: 72 MMHG | RESPIRATION RATE: 16 BRPM | HEART RATE: 87 BPM | OXYGEN SATURATION: 98 %

## 2022-04-15 DIAGNOSIS — R19.7 DIARRHEA, UNSPECIFIED TYPE: Primary | ICD-10-CM

## 2022-04-15 PROCEDURE — 99214 OFFICE O/P EST MOD 30 MIN: CPT | Performed by: FAMILY MEDICINE

## 2022-04-15 RX ORDER — CHOLESTYRAMINE 4 G/9G
1 POWDER, FOR SUSPENSION ORAL
Qty: 60 EACH | Refills: 0 | Status: SHIPPED | OUTPATIENT
Start: 2022-04-15 | End: 2022-08-11

## 2022-04-15 NOTE — PROGRESS NOTES
"  Assessment & Plan       Diarrhea, unspecified type  He is requesting trial of cholestyramine.  Mother also has a history of chronic lifelong diarrhea.  She ultimately was put on cholestyramine by gastroenterologist and her diarrhea resolved with this.  He has had work-up including celiac panel and colonoscopy both of which were normal.  Discussed that I would not be entirely opposed to a short trial of cholestyramine that if that is not helpful would recommend GI consult for further evaluation.  Additionally, if it is helpful will need to discuss ongoing use and monitoring such as titrating to lowest effective dose cholesterol monitoring, monitoring renal function, fat-soluble vitamin levels, etc.  - cholestyramine (QUESTRAN) 4 g packet; Take 1 packet (4 g) by mouth 3 times daily (with meals)  - Adult Gastro Ref - Consult Only; Future       BMI:   Estimated body mass index is 33.67 kg/m  as calculated from the following:    Height as of this encounter: 1.702 m (5' 7\").    Weight as of this encounter: 97.5 kg (215 lb).           No follow-ups on file.    Quinn Mittal MD  [facility]    Aida pineda is a 36 year old who presents for the following health issues     History of Present Illness       Reason for visit:  Gut health  Symptom onset:  More than a month  Symptoms include:  Diarrhea  Symptom intensity:  Severe  Symptom progression:  Staying the same  Had these symptoms before:  Yes  Has tried/received treatment for these symptoms:  No  What makes it worse:  Milk  What makes it better:  No    He eats 0-1 servings of fruits and vegetables daily.He consumes 0 sweetened beverage(s) daily.He exercises with enough effort to increase his heart rate 20 to 29 minutes per day.  He exercises with enough effort to increase his heart rate 3 or less days per week. He is missing 7 dose(s) of medications per week.       Diarrhea  Onset/Duration: years   Description: anything he " "eats or drinks causes bloating and diarrhea        Consistency of stool: watery, loose and explosive       Blood in stool: no       Number of loose stools past 24 hours: 8  Progression of Symptoms: same  Accompanying signs and symptoms:       Fever: no       Nausea/Vomiting: no       Abdominal pain: no       Weight loss: no       Episodes of constipation: no  History   Ill contacts: no  Recent use of antibiotics: no  Recent travels: no  Recent medication-new or changes(Rx or OTC): no  Precipitating or alleviating factors: None  Therapies tried and outcome: Imodium right ear-not effective     He describes a lifelong history from childhood of chronic near daily diarrhea.  Anything from loose to soft stools.  He will occasionally have formed stools.  Has fecal urgency and now works from home as a result.  Has had a normal colonoscopy with random colon biopsies -negative for microscopic colitis.  Normal celiac, CBC, CMP, TSH labs.    Review of Systems   Constitutional, neuro, ENT, endocrine, pulmonary, cardiac, gastrointestinal, genitourinary, musculoskeletal, integument and psychiatric systems are negative, except as otherwise noted.       Objective    /72   Pulse 87   Temp 96.9  F (36.1  C) (Tympanic)   Resp 16   Ht 1.702 m (5' 7\")   Wt 97.5 kg (215 lb)   SpO2 98%   BMI 33.67 kg/m    Body mass index is 33.67 kg/m .  Physical Exam   GENERAL: Pleasant, well appearing male.                  "

## 2022-06-22 ENCOUNTER — MYC MEDICAL ADVICE (OUTPATIENT)
Dept: FAMILY MEDICINE | Facility: CLINIC | Age: 37
End: 2022-06-22

## 2022-06-22 DIAGNOSIS — R19.7 DIARRHEA, UNSPECIFIED TYPE: Primary | ICD-10-CM

## 2022-06-22 NOTE — TELEPHONE ENCOUNTER
Please se MyChart message  Patient states the cholestyramine is working great  Patient uses 9g/day  Patient's other has the same medication but in a can and patient feels lke his mother's mixes better, is less gritty  Patient is requesting the can (378g) instead of the individual packets; if possible    Routing to Dr Mittal  Can patient have order as pended?

## 2022-06-23 RX ORDER — CHOLESTYRAMINE 4 G/9G
4 POWDER, FOR SUSPENSION ORAL
Qty: 378 G | Refills: 1 | Status: SHIPPED | OUTPATIENT
Start: 2022-06-23 | End: 2022-08-12

## 2022-08-16 ENCOUNTER — TELEPHONE (OUTPATIENT)
Dept: FAMILY MEDICINE | Facility: CLINIC | Age: 37
End: 2022-08-16

## 2022-08-16 NOTE — TELEPHONE ENCOUNTER
Patient called requesting to go back to can vs packet. It is more cost effective for patient. --cholestyramine 4 g      Marquita MENDOZA  Station

## 2022-08-16 NOTE — TELEPHONE ENCOUNTER
Patient called and asked if he can established care with Kels[facility].  Patients mom see's and has [facility] for her PCP.  Please call patient with response.      Mira Hollingsworth, OTIS Anderson

## 2022-08-17 ENCOUNTER — MYC MEDICAL ADVICE (OUTPATIENT)
Dept: FAMILY MEDICINE | Facility: CLINIC | Age: 37
End: 2022-08-17

## 2022-08-17 DIAGNOSIS — R19.7 DIARRHEA, UNSPECIFIED TYPE: ICD-10-CM

## 2022-08-17 RX ORDER — CHOLESTYRAMINE 4 G/9G
4 POWDER, FOR SUSPENSION ORAL
Qty: 378 G | Refills: 1 | OUTPATIENT
Start: 2022-08-17

## 2022-08-17 NOTE — TELEPHONE ENCOUNTER
Patient is reached. Told that Kelsi's practice is closed to new patients.  Scheduled with Nancy BANKS RN

## 2022-08-17 NOTE — TELEPHONE ENCOUNTER
"Routing refill request to provider for review/approval because:  Labs not current:  No lipid panel on file        Pending Prescriptions:                       Disp   Refills    cholestyramine (QUESTRAN) 4 GM/DOSE [id number] g  1            Sig: Take 4 g by mouth 3 times daily (with meals)      Requested Prescriptions   Pending Prescriptions Disp Refills     cholestyramine (QUESTRAN) 4 GM/DOSE powder 378 g 1     Sig: Take 4 g by mouth 3 times daily (with meals)       Bile Acid Sequestrant Agents Failed - 8/17/2022 10:23 AM        Failed - Lipid panel on file in past 12 mos     No lab results found.            Passed - Recent (12 mo) or future (30 days) visit within the authorizing provider's specialty     Patient has had an office visit with the authorizing provider or a provider within the authorizing providers department within the previous 12 mos or has a future within next 30 days. See \"Patient Info\" tab in inbasket, or \"Choose Columns\" in Meds & Orders section of the refill encounter.              Passed - Medication is active on med list        Passed - Patient is age 18 years or older           Ann Marie Parnell RN on 8/17/2022 at 10:24 AM    "

## 2022-08-17 NOTE — TELEPHONE ENCOUNTER
See my chart message  Cholestyramine refill, pharmacy needed.    Ann Marie Parnell RN on 8/17/2022 at 10:20 AM

## 2022-08-17 NOTE — TELEPHONE ENCOUNTER
This is a refill for the powder in a tub.  Did he actually initiate this?  Last time he called he wanted them and packets instead that is why this prescription was discontinued.

## 2022-08-18 RX ORDER — CHOLESTYRAMINE 4 G/9G
4 POWDER, FOR SUSPENSION ORAL
Qty: 378 G | Refills: 4 | Status: SHIPPED | OUTPATIENT
Start: 2022-08-18 | End: 2022-08-25

## 2022-08-18 NOTE — TELEPHONE ENCOUNTER
Yes, see [employer] message.  Patient prefers tub due to cost.  Pended  ..  Mira STALLINGS

## 2022-08-25 RX ORDER — CHOLESTYRAMINE 4 G/9G
4 POWDER, FOR SUSPENSION ORAL
Qty: 378 G | Refills: 4 | Status: SHIPPED | OUTPATIENT
Start: 2022-08-25 | End: 2022-09-19

## 2022-08-25 NOTE — TELEPHONE ENCOUNTER
See my chart message  Patient cannot get [facility], my chart problem  Resent to [facility] per patient request    Ann Marie Parnell RN on 8/25/2022 at 10:04 AM

## 2022-09-19 ENCOUNTER — OFFICE VISIT (OUTPATIENT)
Dept: FAMILY MEDICINE | Facility: CLINIC | Age: 37
End: 2022-09-19
Payer: COMMERCIAL

## 2022-09-19 VITALS
HEART RATE: 76 BPM | DIASTOLIC BLOOD PRESSURE: 78 MMHG | BODY MASS INDEX: 33.13 KG/M2 | WEIGHT: 211.1 LBS | TEMPERATURE: 98.6 F | OXYGEN SATURATION: 98 % | RESPIRATION RATE: 16 BRPM | SYSTOLIC BLOOD PRESSURE: 118 MMHG | HEIGHT: 67 IN

## 2022-09-19 DIAGNOSIS — Z13.220 SCREENING FOR HYPERLIPIDEMIA: ICD-10-CM

## 2022-09-19 DIAGNOSIS — R19.7 DIARRHEA, UNSPECIFIED TYPE: Primary | ICD-10-CM

## 2022-09-19 PROCEDURE — 99213 OFFICE O/P EST LOW 20 MIN: CPT | Performed by: NURSE PRACTITIONER

## 2022-09-19 RX ORDER — CHOLESTYRAMINE 4 G/9G
4 POWDER, FOR SUSPENSION ORAL 3 TIMES DAILY PRN
Qty: 378 G | Refills: 11 | Status: SHIPPED | OUTPATIENT
Start: 2022-09-19 | End: 2024-06-28

## 2022-09-19 ASSESSMENT — PAIN SCALES - GENERAL: PAINLEVEL: NO PAIN (0)

## 2022-09-19 NOTE — PROGRESS NOTES
"  Assessment & Plan     Diarrhea, unspecified type  -cholestyramine working well, no side effects, patient is tking multivitamins and vitamin D daily, declined labs today   - cholestyramine (QUESTRAN) 4 GM/DOSE powder; Take 4 g by mouth 3 times daily as needed (diarrhea)    Screening for hyperlipidemia  -declined lipid panel screening today           Return in about 1 year (around 9/19/2023) for Physical Exam, Routine Visit.    JOCELYNE Dalal CNP  [facility] 36 year [facility] male patient, presenting for the following health issues:  Establish Care (Needs a refill cholestyramine powder )    Chief Complaint   Patient presents with     Establish Care     Needs a refill cholestyramine powder        History of Present Illness       Reason for visit:  Set up primary care and perscription renewal    He eats 0-1 servings of fruits and vegetables daily.He consumes 2 sweetened beverage(s) daily.He exercises with enough effort to increase his heart rate 20 to 29 minutes per day.  He exercises with enough effort to increase his heart rate 3 or less days per week.   He is taking medications regularly.         Review of Systems   Constitutional, HEENT, cardiovascular, pulmonary, gi and gu systems are negative, except as otherwise noted.      Objective    /78 (BP Location: Left arm, Patient Position: Sitting, Cuff Size: Adult Large)   Pulse 76   Temp 98.6  F (37  C) (Tympanic)   Resp 16   Ht 1.702 m (5' 7\")   Wt 95.8 kg (211 lb 1.6 oz)   SpO2 98%   BMI 33.06 kg/m    Body mass index is 33.06 kg/m .  Physical Exam   GENERAL: healthy, alert and no distress  EYES: Eyes grossly normal to inspection, PERRL and conjunctivae and sclerae normal  CV: regular rate and rhythm, normal S1 S2, no S3 or S4, no murmur, click or rub, no peripheral edema and peripheral pulses strong  PSYCH: mentation appears normal, affect normal/bright                    "

## 2022-10-01 ENCOUNTER — HEALTH MAINTENANCE LETTER (OUTPATIENT)
Age: 37
End: 2022-10-01

## 2023-10-21 ENCOUNTER — HEALTH MAINTENANCE LETTER (OUTPATIENT)
Age: 38
End: 2023-10-21

## 2024-01-24 DIAGNOSIS — R19.7 DIARRHEA, UNSPECIFIED TYPE: ICD-10-CM

## 2024-01-24 RX ORDER — CHOLESTYRAMINE 4 G/9G
POWDER, FOR SUSPENSION ORAL
Qty: 378 G | Refills: 25 | OUTPATIENT
Start: 2024-01-24

## 2024-06-28 ENCOUNTER — MYC MEDICAL ADVICE (OUTPATIENT)
Dept: FAMILY MEDICINE | Facility: CLINIC | Age: 39
End: 2024-06-28

## 2024-06-28 ENCOUNTER — OFFICE VISIT (OUTPATIENT)
Dept: FAMILY MEDICINE | Facility: CLINIC | Age: 39
End: 2024-06-28
Payer: COMMERCIAL

## 2024-06-28 VITALS
HEART RATE: 68 BPM | OXYGEN SATURATION: 98 % | TEMPERATURE: 97.7 F | WEIGHT: 229.8 LBS | BODY MASS INDEX: 34.83 KG/M2 | SYSTOLIC BLOOD PRESSURE: 102 MMHG | RESPIRATION RATE: 16 BRPM | DIASTOLIC BLOOD PRESSURE: 78 MMHG | HEIGHT: 68 IN

## 2024-06-28 DIAGNOSIS — R07.89 ATYPICAL CHEST PAIN: ICD-10-CM

## 2024-06-28 DIAGNOSIS — R79.89 ELEVATED SERUM CREATININE: ICD-10-CM

## 2024-06-28 DIAGNOSIS — Z13.6 CARDIOVASCULAR SCREENING; LDL GOAL LESS THAN 130: ICD-10-CM

## 2024-06-28 DIAGNOSIS — Z00.00 ROUTINE PHYSICAL EXAMINATION: Primary | ICD-10-CM

## 2024-06-28 DIAGNOSIS — I87.2 VENOUS (PERIPHERAL) INSUFFICIENCY: ICD-10-CM

## 2024-06-28 DIAGNOSIS — R19.7 DIARRHEA, UNSPECIFIED TYPE: ICD-10-CM

## 2024-06-28 LAB
ALBUMIN SERPL BCG-MCNC: 4.2 G/DL (ref 3.5–5.2)
ALP SERPL-CCNC: 76 U/L (ref 40–150)
ALT SERPL W P-5'-P-CCNC: 30 U/L (ref 0–70)
ANION GAP SERPL CALCULATED.3IONS-SCNC: 11 MMOL/L (ref 7–15)
AST SERPL W P-5'-P-CCNC: 27 U/L (ref 0–45)
BILIRUB SERPL-MCNC: 0.3 MG/DL
BUN SERPL-MCNC: 16.7 MG/DL (ref 6–20)
CALCIUM SERPL-MCNC: 9.2 MG/DL (ref 8.6–10)
CHLORIDE SERPL-SCNC: 104 MMOL/L (ref 98–107)
CHOLEST SERPL-MCNC: 175 MG/DL
CREAT SERPL-MCNC: 1.29 MG/DL (ref 0.67–1.17)
DEPRECATED HCO3 PLAS-SCNC: 25 MMOL/L (ref 22–29)
EGFRCR SERPLBLD CKD-EPI 2021: 73 ML/MIN/1.73M2
ERYTHROCYTE [DISTWIDTH] IN BLOOD BY AUTOMATED COUNT: 12.6 % (ref 10–15)
FASTING STATUS PATIENT QL REPORTED: YES
FASTING STATUS PATIENT QL REPORTED: YES
GLUCOSE SERPL-MCNC: 96 MG/DL (ref 70–99)
HBA1C MFR BLD: 5.1 % (ref 0–5.6)
HCT VFR BLD AUTO: 46.6 % (ref 40–53)
HDLC SERPL-MCNC: 39 MG/DL
HGB BLD-MCNC: 16.1 G/DL (ref 13.3–17.7)
LDLC SERPL CALC-MCNC: 89 MG/DL
MCH RBC QN AUTO: 28.9 PG (ref 26.5–33)
MCHC RBC AUTO-ENTMCNC: 34.5 G/DL (ref 31.5–36.5)
MCV RBC AUTO: 84 FL (ref 78–100)
NONHDLC SERPL-MCNC: 136 MG/DL
NT-PROBNP SERPL-MCNC: <36 PG/ML (ref 0–450)
PLATELET # BLD AUTO: 266 10E3/UL (ref 150–450)
POTASSIUM SERPL-SCNC: 4.1 MMOL/L (ref 3.4–5.3)
PROT SERPL-MCNC: 7.2 G/DL (ref 6.4–8.3)
RBC # BLD AUTO: 5.58 10E6/UL (ref 4.4–5.9)
SODIUM SERPL-SCNC: 140 MMOL/L (ref 135–145)
TRIGL SERPL-MCNC: 233 MG/DL
TSH SERPL DL<=0.005 MIU/L-ACNC: 2.56 UIU/ML (ref 0.3–4.2)
WBC # BLD AUTO: 8.6 10E3/UL (ref 4–11)

## 2024-06-28 PROCEDURE — 80061 LIPID PANEL: CPT | Performed by: NURSE PRACTITIONER

## 2024-06-28 PROCEDURE — 80053 COMPREHEN METABOLIC PANEL: CPT | Performed by: NURSE PRACTITIONER

## 2024-06-28 PROCEDURE — 93000 ELECTROCARDIOGRAM COMPLETE: CPT | Performed by: NURSE PRACTITIONER

## 2024-06-28 PROCEDURE — 83036 HEMOGLOBIN GLYCOSYLATED A1C: CPT | Performed by: NURSE PRACTITIONER

## 2024-06-28 PROCEDURE — 83880 ASSAY OF NATRIURETIC PEPTIDE: CPT | Performed by: NURSE PRACTITIONER

## 2024-06-28 PROCEDURE — 90471 IMMUNIZATION ADMIN: CPT | Performed by: NURSE PRACTITIONER

## 2024-06-28 PROCEDURE — 99395 PREV VISIT EST AGE 18-39: CPT | Mod: 25 | Performed by: NURSE PRACTITIONER

## 2024-06-28 PROCEDURE — 85027 COMPLETE CBC AUTOMATED: CPT | Performed by: NURSE PRACTITIONER

## 2024-06-28 PROCEDURE — 99213 OFFICE O/P EST LOW 20 MIN: CPT | Mod: 25 | Performed by: NURSE PRACTITIONER

## 2024-06-28 PROCEDURE — 84443 ASSAY THYROID STIM HORMONE: CPT | Performed by: NURSE PRACTITIONER

## 2024-06-28 PROCEDURE — 90715 TDAP VACCINE 7 YRS/> IM: CPT | Performed by: NURSE PRACTITIONER

## 2024-06-28 PROCEDURE — 36415 COLL VENOUS BLD VENIPUNCTURE: CPT | Performed by: NURSE PRACTITIONER

## 2024-06-28 RX ORDER — CHOLESTYRAMINE 4 G/9G
4 POWDER, FOR SUSPENSION ORAL 3 TIMES DAILY PRN
Qty: 378 G | Refills: 5 | Status: SHIPPED | OUTPATIENT
Start: 2024-06-28

## 2024-06-28 SDOH — HEALTH STABILITY: PHYSICAL HEALTH: ON AVERAGE, HOW MANY DAYS PER WEEK DO YOU ENGAGE IN MODERATE TO STRENUOUS EXERCISE (LIKE A BRISK WALK)?: 5 DAYS

## 2024-06-28 ASSESSMENT — SOCIAL DETERMINANTS OF HEALTH (SDOH): HOW OFTEN DO YOU GET TOGETHER WITH FRIENDS OR RELATIVES?: THREE TIMES A WEEK

## 2024-06-28 NOTE — PROGRESS NOTES
"Preventive Care Visit  [facility]  JOCELYNE Mayorga CNP, Family Medicine  Jun 28, 2024      Assessment & Plan     Routine physical examination    - Hemoglobin A1c; Future    Atypical chest pain    - CBC with platelets; Future  - Comprehensive metabolic panel (BMP + Alb, Alk Phos, ALT, AST, Total. Bili, TP); Future  - TSH with free T4 reflex; Future  - BNP-N terminal pro; Future  - EKG 12-lead complete w/read - Clinics  - Exercise Stress Test - Adult; Future    CARDIOVASCULAR SCREENING; LDL GOAL LESS THAN 130    - Lipid panel reflex to direct LDL Fasting; Future      Nicotine/Tobacco Cessation  He reports that he has been smoking cigars. He has never used smokeless tobacco.  Nicotine/Tobacco Cessation Plan  Information offered: Patient not interested at this time      BMI  Estimated body mass index is 35.46 kg/m  as calculated from the following:    Height as of this encounter: 1.715 m (5' 7.5\").    Weight as of this encounter: 104.2 kg (229 lb 12.8 oz).   Weight management plan: Patient referred to endocrine and/or weight management specialty    Counseling  Appropriate preventive services were discussed with this patient, including applicable screening as appropriate for fall prevention, nutrition, physical activity, Tobacco-use cessation, weight loss and cognition.  Checklist reviewing preventive services available has been given to the patient.  Reviewed patient's diet, addressing concerns and/or questions.   He is at risk for psychosocial distress and has been provided with information to reduce risk.       FURTHER TESTING:       - treadmill stress    FUTURE APPOINTMENTS:       - Follow-up for annual visit or as needed    Work on weight loss  Regular exercise    See Patient Instructions    Aida pineda is a 38 year old, presenting for the following:  Physical        6/28/2024     6:42 AM   Additional Questions   Roomed by Leyda HERZOG        Health Care Directive  Patient " does not have a Health Care Directive or Living Will: Discussed advance care planning with patient; however, patient declined at this time.    HPI  Other concerns-heavy feeling in chest, racing heart with simple activity.     Chest Pain  Onset/Duration: 4 days  Description:   Location: left side  Character: pressure  Radiation: n/a  Duration: intermittent   Intensity: moderate  Progression of Symptoms: intermittent  Accompanying Signs & Symptoms:  Shortness of breath: No  Sweating: No  Nausea/vomiting: No  Lightheadedness: No  Palpitations: No  Fever/Chills: No  Cough: No           Heartburn: No  History:   Family history of heart disease: No  Tobacco use: No  Previous similar symptoms: no   Precipitating factors:   Worse with exertion: YES  Worse with deep breaths: No           Related to eating: No           Better with burping: No  Alleviating factors: rest  Therapies tried and outcome: rest/inactivity        6/28/2024   General Health   How would you rate your overall physical health? Good   Feel stress (tense, anxious, or unable to sleep) Only a little      (!) STRESS CONCERN      6/28/2024   Nutrition   Three or more servings of calcium each day? Yes   Diet: Regular (no restrictions)    I don't know   How many servings of fruit and vegetables per day? (!) 0-1   How many sweetened beverages each day? 0-1       Multiple values from one day are sorted in reverse-chronological order         6/28/2024   Exercise   Days per week of moderate/strenous exercise 5 days            6/28/2024   Social Factors   Frequency of gathering with friends or relatives Three times a week   Worry food won't last until get money to buy more No   Food not last or not have enough money for food? No   Do you have housing? (Housing is defined as stable permanent housing and does not include staying ouside in a car, in a tent, in an abandoned building, in an overnight shelter, or couch-surfing.) No   Are you worried about losing your  "housing? No   Lack of transportation? No   Unable to get utilities (heat,electricity)? No   Want help with housing or utility concern? No      (!) HOUSING CONCERN PRESENT      6/28/2024   Dental   Dentist two times every year? Yes            6/28/2024   TB Screening   Were you born outside of the [place]? Yes            Today's PHQ-2 Score:       6/28/2024     6:40 AM   PHQ-2 ( 1999 [employer])   Q1: Little interest or pleasure in doing things 1   Q2: Feeling down, depressed or hopeless 1   PHQ-2 Score 2   Q1: Little interest or pleasure in doing things Several days   Q2: Feeling down, depressed or hopeless Several days   PHQ-2 Score 2           6/28/2024   Substance Use   Alcohol more than 3/day or more than 7/wk No   Do you use any other substances recreationally? No        Social History     Tobacco Use    Smoking status: Some Days     Types: Cigars    Smokeless tobacco: Never   Vaping Use    Vaping status: Never Used   Substance Use Topics    Alcohol use: Not Currently    Drug use: Never           6/28/2024   STI Screening   New sexual partner(s) since last STI/HIV test? No            6/28/2024   Contraception/Family Planning   Questions about contraception or family planning No           Reviewed and updated as needed this visit by Provider                    Lab work is in process  Labs reviewed in EPIC  BP Readings from Last 3 Encounters:   06/28/24 102/78   09/19/22 118/78   04/15/22 104/72    Wt Readings from Last 3 Encounters:   06/28/24 104.2 kg (229 lb 12.8 oz)   09/19/22 95.8 kg (211 lb 1.6 oz)   04/15/22 97.5 kg (215 lb)                      Review of Systems  Constitutional, HEENT, cardiovascular, pulmonary, gi and gu systems are negative, except as otherwise noted.     Objective    Exam  /78   Pulse 68   Temp 97.7  F (36.5  C) (Tympanic)   Resp 16   Ht 1.715 m (5' 7.5\")   Wt 104.2 kg (229 lb 12.8 oz)   SpO2 98%   BMI 35.46 kg/m     Estimated body mass index is 35.46 kg/m  as calculated from the " "following:    Height as of this encounter: 1.715 m (5' 7.5\").    Weight as of this encounter: 104.2 kg (229 lb 12.8 oz).    Physical Exam  GENERAL: alert and no distress  EYES: Eyes grossly normal to inspection and conjunctivae and sclerae normal  HENT: normal cephalic/atraumatic, ear canals and TM's normal, oropharynx clear, and oral mucous membranes moist  NECK: no adenopathy, no asymmetry, masses, or scars  RESP: lungs clear to auscultation - no rales, rhonchi or wheezes  CV: regular rate and rhythm, normal S1 S2, no S3 or S4, no murmur, click or rub, no peripheral edema  ABDOMEN: soft, nontender and no palpable or pulsatile masses  MS: no gross musculoskeletal defects noted, no edema  SKIN: tan patches to anterior and posterior lower legs  NEURO: Normal strength and tone, mentation intact and speech normal  PSYCH: mentation appears normal, affect normal/bright        Signed Electronically by: JOCELYNE Mayorga CNP    "

## 2024-07-01 NOTE — RESULT ENCOUNTER NOTE
"Giovanna Branch    Your triglycerides are high on the lipid panel. Recommend to limit alcohol, processed foods and white carbs and healthy diet. Recheck in 3 years. You can visit:     [email or web address]    For some information on a healthy diet.     Diabetes screening is normal. The kidney function is slightly down. Recommend to make sure you are drinking plenty of water and avoid over the counter pain medications (Tylenol is ok < 3 gm/ 24 hr). Schedule a lab draw for a recheck in a month or two. Heart enzyme is normal. Thyroid function is normal. Blood counts are normal.       Please let us know if you have any questions.     Take care,    JOCELYNE Philip CNP    TEST DESCRIPTIONS   CBC (Complete Blood Count) includes hemoglobin, hematocrit, white blood cells, etc. This test can be used to detect anemia, infection, and abnormalities in blood cells.   Cholesterol is one of the blood fats (lipids) and is the building block used by the body for cell wall and hormone production. Increased levels of cholesterol have been proven to directly contribute to heart disease and strokes.   Triglycerides are one of the blood fats (lipids) and are thought to be associated with heart disease. If you have had anything to eat or drink other than water for 12 hours before the test and your level is high, you should have the test repeated after a 12 hour fast. Abnormally high results may also be associated with diabetes, kidney and liver diseases.   LDL is the low density lipoprotein component of the cholesterol. It is the harmful substance that deposits cholesterol on the artery walls contributing to heart attacks and strokes. A high LDL level is associated with higher risk of coronary heart disease.   HDL stands for high density lipoprotein and refers to the so-called \"good\" cholesterol. HDL picks up excess cholesterol in the " bloodstream and carries it back to the liver for disposal. Individuals with higher than average HDL seem to have a lower risk of coronary disease. Vigorous exercise will help increase the blood levels of HDL.   Risk Factor (Total cholesterol/HDL) is a commonly used ratio for cardiac risk assessment. Less than 5.0 for men and 4.4 for women is ideal.   Sodium is an electrolyte useful in diagnosis of dehydration, diabetes, hypertension, or other diseases involving electrolyte imbalance. It also preserves the balance between calcium and potassium to maintain normal heart action and equilibrium of the body.   Potassium is also an electrolyte that works with sodium to regulate the body's water balance and normalize heart rhythm.   Glucose is a measure of blood sugar and is one of the tests for diabetes. If you have not been fasting, your level will often be high. A low glucose level may be a cause of weakness or dizziness. Blood sugar ranks with cholesterol as a causative factor in arteriosclerosis and heart attacks.   BUN & Creatinine are waste products excreted by the kidneys. A high BUN can be related to a high protein diet, heavy exercise, fever and infections, dehydration, kidney stones, and kidney disease. Creatinine elevation is less dependent on diet or exercise and better represents kidney impairment. Low values are not generally significant.   Calcium is a mineral in the blood controlled by the parathyroid glands and kidneys. It is important in the formation of bone, in muscle and nerve function, and in blood clotting. Disease of the parathyroid gland, diseased bones or kidneys, or defective absorption of calcium may cause abnormal levels from the intestine.   ALT, AST, Alk Phos are liver tests. Enzymes found in the liver as well as skeletal and cardiac muscle. Elevations can often be seen in alcoholism, liver or heart disease. Slightly abnormal values are not considered significant.   TSH stands for Thyroid  Stimulating Hormone. A sensitive test used to determine how the thyroid gland is functioning. The thyroid gland produces hormones that control the body's metabolism. When too few hormones are produced (increased TSH), hypothyroidism occurs which can cause fatigue, sensitivity to cold, and weight gain - an overall slowing down of bodily functions. When too many thyroid hormones are produces (or decreased TSH), it creates a condition call hyperthyroidism (such as Graves' disease) which causes rapid heartbeat, weight loss, and dizziness, among other symptoms.   PSA stands for Prostate Specific Antigen. Elevated levels of PSA can increase with trauma, infection, inflammation, or disease processes in the prostate such as BPH (Benigh Prostatic Hypertrophy) or cancer.   Glycohemoglobin or HgBA1C is a 3-month average of blood sugar

## 2024-07-01 NOTE — TELEPHONE ENCOUNTER
Please see my chart message.     Patient requesting Testosterone lab    Pended.     Ann Marie Parnell RN on 7/1/2024 at 1:28 PM

## 2024-07-11 ENCOUNTER — HOSPITAL ENCOUNTER (OUTPATIENT)
Dept: CARDIOLOGY | Facility: CLINIC | Age: 39
Discharge: HOME | End: 2024-07-11
Attending: NURSE PRACTITIONER | Admitting: NURSE PRACTITIONER
Payer: COMMERCIAL

## 2024-07-11 DIAGNOSIS — R07.89 ATYPICAL CHEST PAIN: ICD-10-CM

## 2024-07-11 PROCEDURE — 93017 CV STRESS TEST TRACING ONLY: CPT

## 2024-07-11 PROCEDURE — 93018 CV STRESS TEST I&R ONLY: CPT | Performed by: INTERNAL MEDICINE

## 2024-07-11 PROCEDURE — 93016 CV STRESS TEST SUPVJ ONLY: CPT | Performed by: INTERNAL MEDICINE

## 2024-07-31 NOTE — RESULT ENCOUNTER NOTE
Giovanna Branch    Your stress test results came back within normal limits. Please let us know if you have any questions.     Take care,    JOCELYNE Philip CNP

## 2025-05-29 ENCOUNTER — PATIENT OUTREACH (OUTPATIENT)
Dept: CARE COORDINATION | Facility: CLINIC | Age: 40
End: 2025-05-29
Payer: COMMERCIAL

## 2025-06-12 ENCOUNTER — PATIENT OUTREACH (OUTPATIENT)
Dept: CARE COORDINATION | Facility: CLINIC | Age: 40
End: 2025-06-12
Payer: COMMERCIAL

## 2025-08-10 ENCOUNTER — HEALTH MAINTENANCE LETTER (OUTPATIENT)
Age: 40
End: 2025-08-10